# Patient Record
Sex: MALE | URBAN - METROPOLITAN AREA
[De-identification: names, ages, dates, MRNs, and addresses within clinical notes are randomized per-mention and may not be internally consistent; named-entity substitution may affect disease eponyms.]

---

## 2019-05-01 ENCOUNTER — APPOINTMENT (RX ONLY)
Dept: URBAN - METROPOLITAN AREA CLINIC 147 | Facility: CLINIC | Age: 49
Setting detail: DERMATOLOGY
End: 2019-05-01

## 2019-05-01 DIAGNOSIS — L40.0 PSORIASIS VULGARIS: ICD-10-CM

## 2019-05-01 PROBLEM — F32.9 MAJOR DEPRESSIVE DISORDER, SINGLE EPISODE, UNSPECIFIED: Status: ACTIVE | Noted: 2019-05-01

## 2019-05-01 PROBLEM — F41.9 ANXIETY DISORDER, UNSPECIFIED: Status: ACTIVE | Noted: 2019-05-01

## 2019-05-01 PROBLEM — J44.9 CHRONIC OBSTRUCTIVE PULMONARY DISEASE, UNSPECIFIED: Status: ACTIVE | Noted: 2019-05-01

## 2019-05-01 PROBLEM — M12.9 ARTHROPATHY, UNSPECIFIED: Status: ACTIVE | Noted: 2019-05-01

## 2019-05-01 PROBLEM — I10 ESSENTIAL (PRIMARY) HYPERTENSION: Status: ACTIVE | Noted: 2019-05-01

## 2019-05-01 PROCEDURE — ? COUNSELING

## 2019-05-01 PROCEDURE — 99202 OFFICE O/P NEW SF 15 MIN: CPT

## 2019-05-01 PROCEDURE — ? PRESCRIPTION

## 2019-05-01 PROCEDURE — ? ADDITIONAL NOTES

## 2019-05-01 PROCEDURE — ? DEFER

## 2019-05-01 RX ORDER — CLOBETASOL PROPIONATE 0.5 MG/G
CREAM TOPICAL BID
Qty: 1 | Refills: 0 | COMMUNITY
Start: 2019-05-01

## 2019-05-01 RX ORDER — CALCIPOTRIENE 50 UG/G
CREAM TOPICAL
Qty: 1 | Refills: 0 | COMMUNITY
Start: 2019-05-01

## 2019-05-01 RX ADMIN — CLOBETASOL PROPIONATE: 0.5 CREAM TOPICAL at 17:18

## 2019-05-01 RX ADMIN — CALCIPOTRIENE: 50 CREAM TOPICAL at 17:14

## 2019-05-01 ASSESSMENT — LOCATION ZONE DERM
LOCATION ZONE: PENIS
LOCATION ZONE: LEG
LOCATION ZONE: ARM

## 2019-05-01 ASSESSMENT — LOCATION SIMPLE DESCRIPTION DERM
LOCATION SIMPLE: RIGHT ELBOW
LOCATION SIMPLE: LEFT KNEE
LOCATION SIMPLE: LEFT ELBOW
LOCATION SIMPLE: RIGHT KNEE
LOCATION SIMPLE: PENIS

## 2019-05-01 ASSESSMENT — LOCATION DETAILED DESCRIPTION DERM
LOCATION DETAILED: LEFT ELBOW
LOCATION DETAILED: LEFT DORSAL SHAFT OF PENIS
LOCATION DETAILED: RIGHT ELBOW
LOCATION DETAILED: RIGHT KNEE
LOCATION DETAILED: LEFT KNEE

## 2019-05-01 NOTE — HPI: RASH (PSORIASIS)
How Severe Is Your Psoriasis?: mild
Do You Have A Family History Of Psoriasis?: yes
Is This A New Presentation, Or A Follow-Up?: Psoriasis
Additional History: Patient states sofia worked for 2 years and then started flaring up again

## 2019-05-01 NOTE — PROCEDURE: DEFER
Detail Level: Detailed
Scheduling Instructions (Optional): with Silvana EDDY for Consentyx
Introduction Text (Please End With A Colon): The following procedure was deferred:

## 2019-05-23 ENCOUNTER — APPOINTMENT (RX ONLY)
Dept: URBAN - METROPOLITAN AREA CLINIC 147 | Facility: CLINIC | Age: 49
Setting detail: DERMATOLOGY
End: 2019-05-23

## 2019-05-23 DIAGNOSIS — L40.0 PSORIASIS VULGARIS: ICD-10-CM | Status: INADEQUATELY CONTROLLED

## 2019-05-23 DIAGNOSIS — L40.59 OTHER PSORIATIC ARTHROPATHY: ICD-10-CM

## 2019-05-23 PROCEDURE — ? COUNSELING

## 2019-05-23 PROCEDURE — ? ADDITIONAL NOTES

## 2019-05-23 PROCEDURE — 99214 OFFICE O/P EST MOD 30 MIN: CPT

## 2019-05-23 ASSESSMENT — LOCATION SIMPLE DESCRIPTION DERM
LOCATION SIMPLE: LEFT PRETIBIAL REGION
LOCATION SIMPLE: LEFT 3RD TOE
LOCATION SIMPLE: LEFT KNEE
LOCATION SIMPLE: RIGHT KNEE
LOCATION SIMPLE: RIGHT SHOULDER
LOCATION SIMPLE: LEFT HAND
LOCATION SIMPLE: RIGHT PRETIBIAL REGION
LOCATION SIMPLE: ABDOMEN
LOCATION SIMPLE: RIGHT HAND
LOCATION SIMPLE: RIGHT ELBOW
LOCATION SIMPLE: RIGHT 2ND TOE
LOCATION SIMPLE: PENIS
LOCATION SIMPLE: LEFT SHOULDER
LOCATION SIMPLE: RIGHT BUTTOCK
LOCATION SIMPLE: LEFT LOWER BACK
LOCATION SIMPLE: LEFT ELBOW
LOCATION SIMPLE: LEFT BUTTOCK

## 2019-05-23 ASSESSMENT — LOCATION ZONE DERM
LOCATION ZONE: PENIS
LOCATION ZONE: TOE
LOCATION ZONE: TRUNK
LOCATION ZONE: HAND
LOCATION ZONE: LEG
LOCATION ZONE: ARM

## 2019-05-23 ASSESSMENT — PGA PSORIASIS: PGA PSORIASIS: MARKED (MARKED PLAQUE ELEVATION, BRIGHT ERYTHEMA, THICK NONTENACIOUS SCALE PREDOMINATES)

## 2019-05-23 ASSESSMENT — LOCATION DETAILED DESCRIPTION DERM
LOCATION DETAILED: LEFT BUTTOCK
LOCATION DETAILED: LEFT DORSAL 3RD TOE
LOCATION DETAILED: LEFT INFERIOR MEDIAL MIDBACK
LOCATION DETAILED: RIGHT MEDIAL BUTTOCK
LOCATION DETAILED: LEFT ELBOW
LOCATION DETAILED: RIGHT POSTERIOR SHOULDER
LOCATION DETAILED: LEFT DORSAL MIDDLE METACARPOPHALANGEAL JOINT
LOCATION DETAILED: RIGHT PROXIMAL PRETIBIAL REGION
LOCATION DETAILED: RIGHT DORSAL 2ND TOE
LOCATION DETAILED: RIGHT BUTTOCK
LOCATION DETAILED: LEFT POSTERIOR SHOULDER
LOCATION DETAILED: LEFT DORSAL SHAFT OF PENIS
LOCATION DETAILED: LEFT PROXIMAL PRETIBIAL REGION
LOCATION DETAILED: RIGHT KNEE
LOCATION DETAILED: RIGHT DORSAL MIDDLE METACARPOPHALANGEAL JOINT
LOCATION DETAILED: LEFT KNEE
LOCATION DETAILED: PERIUMBILICAL SKIN
LOCATION DETAILED: RIGHT ELBOW

## 2019-05-23 ASSESSMENT — BSA PSORIASIS: % BODY COVERED IN PSORIASIS: 17

## 2019-05-23 NOTE — PROCEDURE: ADDITIONAL NOTES
Additional Notes: PPD negative on 3/14/19; copy of results scanned into attachments\\n\\nDiscussed RX for Skyrizi. Form with RX will be filled out and sent to [employer], as pt will need patient assistance since he is uninsured. See attachments for copy of form. Additional Notes: PPD negative on 3/14/19; copy of results scanned into attachments\\n\\nDiscussed RX for Skyrizi. Form with RX will be filled out and sent to [employer], as pt will need patient assistance since he is uninsured. See attachments for copy of form.

## 2019-07-16 ENCOUNTER — APPOINTMENT (RX ONLY)
Dept: URBAN - METROPOLITAN AREA CLINIC 147 | Facility: CLINIC | Age: 49
Setting detail: DERMATOLOGY
End: 2019-07-16

## 2019-07-16 DIAGNOSIS — L40.0 PSORIASIS VULGARIS: ICD-10-CM

## 2019-07-16 PROCEDURE — ? ORDER TESTS

## 2019-07-16 NOTE — PROCEDURE: ORDER TESTS
Expected Date Of Service: 07/16/2019
Bill For Surgical Tray: no
Billing Type: Third-Party Bill
Performing Laboratory: [id number]

## 2019-08-05 ENCOUNTER — APPOINTMENT (RX ONLY)
Dept: URBAN - METROPOLITAN AREA CLINIC 147 | Facility: CLINIC | Age: 49
Setting detail: DERMATOLOGY
End: 2019-08-05

## 2019-08-05 ENCOUNTER — RX ONLY (OUTPATIENT)
Age: 49
Setting detail: RX ONLY
End: 2019-08-05

## 2019-08-05 DIAGNOSIS — L40.0 PSORIASIS VULGARIS: ICD-10-CM

## 2019-08-05 PROCEDURE — ? ORDER TESTS

## 2019-08-05 RX ORDER — TRIAMCINOLONE ACETONIDE 1 MG/G
CREAM TOPICAL
Qty: 1 | Refills: 1 | Status: ERX | COMMUNITY
Start: 2019-08-05

## 2019-09-03 ENCOUNTER — APPOINTMENT (RX ONLY)
Dept: URBAN - METROPOLITAN AREA CLINIC 116 | Facility: CLINIC | Age: 49
Setting detail: DERMATOLOGY
End: 2019-09-03

## 2019-09-03 VITALS — WEIGHT: 225 LBS

## 2019-09-03 DIAGNOSIS — L40.0 PSORIASIS VULGARIS: ICD-10-CM

## 2019-09-03 DIAGNOSIS — L40.59 OTHER PSORIATIC ARTHROPATHY: ICD-10-CM

## 2019-09-03 PROBLEM — M12.9 ARTHROPATHY, UNSPECIFIED: Status: ACTIVE | Noted: 2019-09-03

## 2019-09-03 PROCEDURE — ? COUNSELING

## 2019-09-03 PROCEDURE — ? COSENTYX INITIATION

## 2019-09-03 PROCEDURE — 99202 OFFICE O/P NEW SF 15 MIN: CPT

## 2019-09-03 PROCEDURE — ? TREATMENT REGIMEN

## 2019-09-03 PROCEDURE — ? LAB REPORTS REVIEWED

## 2019-09-03 ASSESSMENT — LOCATION SIMPLE DESCRIPTION DERM
LOCATION SIMPLE: LEFT MIDDLE FINGER
LOCATION SIMPLE: GROIN
LOCATION SIMPLE: LEFT 2ND TOE
LOCATION SIMPLE: LEFT PRETIBIAL REGION
LOCATION SIMPLE: RIGHT ELBOW
LOCATION SIMPLE: GENITALIA
LOCATION SIMPLE: RIGHT PRETIBIAL REGION
LOCATION SIMPLE: LEFT CALF
LOCATION SIMPLE: LEFT INDEX FINGER
LOCATION SIMPLE: RIGHT CALF
LOCATION SIMPLE: LEFT FOREARM
LOCATION SIMPLE: LEFT KNEE

## 2019-09-03 ASSESSMENT — LOCATION DETAILED DESCRIPTION DERM
LOCATION DETAILED: LEFT INDEX FINGERNAIL
LOCATION DETAILED: SUPRAPUBIC SKIN
LOCATION DETAILED: GENITALIA
LOCATION DETAILED: LEFT KNEE
LOCATION DETAILED: RIGHT DISTAL CALF
LOCATION DETAILED: LEFT DISTAL PRETIBIAL REGION
LOCATION DETAILED: LEFT PROXIMAL DORSAL FOREARM
LOCATION DETAILED: RIGHT LATERAL ELBOW
LOCATION DETAILED: LEFT 2ND TOENAIL
LOCATION DETAILED: LEFT DISTAL DORSAL MIDDLE FINGER
LOCATION DETAILED: RIGHT PROXIMAL PRETIBIAL REGION
LOCATION DETAILED: LEFT DISTAL LATERAL CALF

## 2019-09-03 ASSESSMENT — LOCATION ZONE DERM
LOCATION ZONE: LEG
LOCATION ZONE: FINGER
LOCATION ZONE: TOENAIL
LOCATION ZONE: FINGERNAIL
LOCATION ZONE: TRUNK
LOCATION ZONE: ARM
LOCATION ZONE: GENITALIA

## 2019-09-03 ASSESSMENT — BSA PSORIASIS: % BODY COVERED IN PSORIASIS: 10

## 2019-09-03 ASSESSMENT — PGA PSORIASIS: PGA PSORIASIS: MODERATE (MODERATE PLAQUE ELEVATION, MODERATE ERYTHEMA, COARSE SCALE PREDOMINATES)

## 2019-09-03 NOTE — PROCEDURE: COSENTYX INITIATION
Pregnancy And Lactation Warning Text: This medication is Pregnancy Category B and is considered safe during pregnancy. It is unknown if this medication is excreted in breast milk.
Is Soriatane Contraindicated?: No
Diagnosis (Required): Psoriasis
Detail Level: Zone
Cosentyx Monitoring Guidelines: A yearly test for tuberculosis is required while taking Cosentyx.
Cosentyx Dosing: 300 mg SC week 0, 1, 2, 3, and 4 then every 4 weeks after that

## 2019-09-18 ENCOUNTER — RX ONLY (OUTPATIENT)
Age: 49
Setting detail: RX ONLY
End: 2019-09-18

## 2019-09-18 RX ORDER — SECUKINUMAB 150 MG/ML
INJECTION SUBCUTANEOUS
Qty: 5 | Refills: 0

## 2019-09-18 RX ORDER — SECUKINUMAB 150 MG/ML
INJECTION SUBCUTANEOUS
Qty: 3 | Refills: 3 | COMMUNITY
Start: 2019-09-18

## 2024-06-13 NOTE — PROCEDURE: ADDITIONAL NOTES
CT chest okay but shows an enlarged thyroid  We will check a thyroid ultrasound  
Detail Level: Simple
Additional Notes: Patient states he has joint pain.
Additional Notes: Patient will use TAC cream he has at home on penis thin film for two to three days at the same time. Explained to patient side effects of steroids on skin with chronic use. He understood